# Patient Record
Sex: MALE | Race: BLACK OR AFRICAN AMERICAN | Employment: FULL TIME | URBAN - METROPOLITAN AREA
[De-identification: names, ages, dates, MRNs, and addresses within clinical notes are randomized per-mention and may not be internally consistent; named-entity substitution may affect disease eponyms.]

---

## 2021-04-19 ENCOUNTER — HOSPITAL ENCOUNTER (OUTPATIENT)
Dept: GENERAL RADIOLOGY | Age: 41
Discharge: HOME | End: 2021-04-19
Payer: MEDICARE

## 2021-04-19 ENCOUNTER — HOSPITAL ENCOUNTER (OUTPATIENT)
Age: 41
Discharge: HOME | End: 2021-04-19
Payer: MEDICARE

## 2021-04-19 DIAGNOSIS — M25.511 RIGHT SHOULDER PAIN, UNSPECIFIED CHRONICITY: ICD-10-CM

## 2021-04-19 PROCEDURE — 73030 X-RAY EXAM OF SHOULDER: CPT

## 2024-08-06 ENCOUNTER — HOSPITAL ENCOUNTER (EMERGENCY)
Age: 44
Discharge: HOME | End: 2024-08-06

## 2024-08-06 ENCOUNTER — APPOINTMENT (OUTPATIENT)
Dept: GENERAL RADIOLOGY | Age: 44
End: 2024-08-06

## 2024-08-06 VITALS
BODY MASS INDEX: 29.4 KG/M2 | HEART RATE: 74 BPM | SYSTOLIC BLOOD PRESSURE: 132 MMHG | TEMPERATURE: 98.3 F | HEIGHT: 71 IN | OXYGEN SATURATION: 99 % | DIASTOLIC BLOOD PRESSURE: 78 MMHG | RESPIRATION RATE: 12 BRPM | WEIGHT: 210 LBS

## 2024-08-06 DIAGNOSIS — S46.911A RIGHT SHOULDER STRAIN, INITIAL ENCOUNTER: Primary | ICD-10-CM

## 2024-08-06 DIAGNOSIS — K08.89 LOOSENING OF TOOTH: ICD-10-CM

## 2024-08-06 PROCEDURE — 6370000000 HC RX 637 (ALT 250 FOR IP): Performed by: NURSE PRACTITIONER

## 2024-08-06 PROCEDURE — 99283 EMERGENCY DEPT VISIT LOW MDM: CPT

## 2024-08-06 PROCEDURE — 73030 X-RAY EXAM OF SHOULDER: CPT

## 2024-08-06 RX ORDER — ACETAMINOPHEN 325 MG/1
650 TABLET ORAL ONCE
Status: COMPLETED | OUTPATIENT
Start: 2024-08-06 | End: 2024-08-06

## 2024-08-06 RX ORDER — IBUPROFEN 400 MG/1
400 TABLET ORAL ONCE
Status: COMPLETED | OUTPATIENT
Start: 2024-08-06 | End: 2024-08-06

## 2024-08-06 RX ADMIN — IBUPROFEN 400 MG: 400 TABLET, FILM COATED ORAL at 15:04

## 2024-08-06 RX ADMIN — ACETAMINOPHEN 650 MG: 325 TABLET ORAL at 15:04

## 2024-08-06 ASSESSMENT — PAIN DESCRIPTION - DESCRIPTORS: DESCRIPTORS: ACHING

## 2024-08-06 ASSESSMENT — PAIN DESCRIPTION - FREQUENCY: FREQUENCY: CONTINUOUS

## 2024-08-06 ASSESSMENT — PAIN SCALES - GENERAL: PAINLEVEL_OUTOF10: 9

## 2024-08-06 ASSESSMENT — PAIN DESCRIPTION - ORIENTATION: ORIENTATION: RIGHT

## 2024-08-06 ASSESSMENT — PAIN DESCRIPTION - LOCATION: LOCATION: SHOULDER

## 2024-08-06 ASSESSMENT — PAIN DESCRIPTION - PAIN TYPE: TYPE: ACUTE PAIN

## 2024-08-06 ASSESSMENT — PAIN - FUNCTIONAL ASSESSMENT
PAIN_FUNCTIONAL_ASSESSMENT: 0-10
PAIN_FUNCTIONAL_ASSESSMENT: NONE - DENIES PAIN
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
## 2024-08-06 NOTE — DISCHARGE INSTRUCTIONS
Dental Emergency Referrals    [facility] Department Clinics ([place] residents only)    [facility]  [address] (25)  [phone]   [facility]  [address]  [phone]   [facility]  [address]  [phone]   [facility]  (entrance on [facility] [address] [address] [facility]  [address]  [phone]   [facility]  [address]  [phone]   [facility]  [address]  [phone]       [place] Clinics offering Dental Services     [facility]  [address]  [phone]   [facility]  [address]  [phone]     [facility]  [address] 2nd floor  [phone]   [facility]  [address] (96)   [phone]     [facility]  [place]: [address]  [address]: [address]   [phone]   [facility]  [address]  [phone]     [facility]  [address]  [phone]   [place] [facility]  [address]  [phone]     [address]  Oral Surgery Dept: [phone]  Dental Clinic: [phone]   [facility]  [address]  [phone]     [facility]  [address] [phone]   [facility]   [address], [place] [address]  [place] : [phone]  [place] : [phone]     [facility]  [address]  [phone]    Other Dental Clinics in the area    [facility] (Dental Ur[facility])  [place]  (Across from [facility])  [address] [place] [address] [phone]?      Pediatric Only Dentists    [facility]   Up to age 21  [address]  [phone]    [facility]  Up to age 21  [place]:
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
## 2024-08-06 NOTE — DISCHARGE INSTR - COC
Continuity of Care Form    Patient Name: Rito Ashley   :  1980  MRN:  [id number]    Admit date:  2024  Discharge date:  ***    Code Status Order: No Order   Advance Directives:     Admitting Physician:  No admitting provider for patient encounter.  PCP: No primary care provider on file.    Discharging Nurse: ***  Discharging Hospital Unit/Room#: RW-NATALIE/NONE  Discharging Unit Phone Number: ***    Emergency Contact:   Extended Emergency Contact Information  Primary Emergency Contact: KAYLYNN ASHLEY  Home Phone: [phone]  Relation: Parent    Past Surgical History:  History reviewed. No pertinent surgical history.    Immunization History:     There is no immunization history on file for this patient.    Active Problems:  There is no problem list on file for this patient.      Isolation/Infection:   Isolation            No Isolation          Patient Infection Status       None to display            Nurse Assessment:  Last Vital Signs: /78   Pulse 74   Temp 98.3 °F (36.8 °C) (Oral)   Resp 12   Ht 1.791 m (5' 10.5\")   Wt 95.3 kg (210 lb)   SpO2 99%   BMI 29.71 kg/m²     Last documented pain score (0-10 scale): Pain Level: 9  Last Weight:   Wt Readings from Last 1 Encounters:   24 95.3 kg (210 lb)     Mental Status:  {IP PT MENTAL STATUS:}    IV Access:  { [facility]ANKLIN IV ACCESS:635710827}    Nursing Mobility/ADLs:  Walking   {CHP DME ADLs:587645792}  Transfer  {CHP DME ADLs:450514191}  Bathing  {CHP DME ADLs:827180562}  Dressing  {CHP DME ADLs:424198360}  Toileting  {CHP DME ADLs:154294227}  Feeding  {CHP DME ADLs:234972594}  Med Admin  {CHP DME ADLs:467478982}  Med Delivery   { [facility]ANKLIN MED Delivery:512901080}    Wound Care Documentation and Therapy:        Elimination:  Continence:   Bowel: {YES / NO:}  Bladder: {YES / NO:}  Urinary Catheter: {Urinary Catheter:127571330}   Colostomy/Ileostomy/Ileal Conduit: {YES / NO:}       Date of Last BM: ***  No intake or output